# Patient Record
Sex: FEMALE | Race: WHITE | NOT HISPANIC OR LATINO | URBAN - METROPOLITAN AREA
[De-identification: names, ages, dates, MRNs, and addresses within clinical notes are randomized per-mention and may not be internally consistent; named-entity substitution may affect disease eponyms.]

---

## 2021-06-15 ENCOUNTER — OFFICE VISIT (OUTPATIENT)
Dept: URBAN - METROPOLITAN AREA CLINIC 78 | Facility: CLINIC | Age: 51
End: 2021-06-15
Payer: SELF-PAY

## 2021-06-15 VITALS
HEART RATE: 58 BPM | HEIGHT: 64 IN | RESPIRATION RATE: 16 BRPM | DIASTOLIC BLOOD PRESSURE: 76 MMHG | TEMPERATURE: 97.8 F | SYSTOLIC BLOOD PRESSURE: 150 MMHG | WEIGHT: 152 LBS | BODY MASS INDEX: 25.95 KG/M2

## 2021-06-15 DIAGNOSIS — R10.84 ABDOMINAL CRAMPING, GENERALIZED: ICD-10-CM

## 2021-06-15 DIAGNOSIS — R12 HEARTBURN: ICD-10-CM

## 2021-06-15 DIAGNOSIS — R63.4 UNINTENTIONAL WEIGHT LOSS: ICD-10-CM

## 2021-06-15 DIAGNOSIS — R11.2 NAUSEA AND VOMITING, INTRACTABILITY OF VOMITING NOT SPECIFIED, UNSPECIFIED VOMITING TYPE: ICD-10-CM

## 2021-06-15 DIAGNOSIS — R10.9 ABDOMINAL PAIN: ICD-10-CM

## 2021-06-15 DIAGNOSIS — R05 COUGH: ICD-10-CM

## 2021-06-15 DIAGNOSIS — A04.8 H. PYLORI INFECTION: ICD-10-CM

## 2021-06-15 DIAGNOSIS — R19.06 EPIGASTRIC FULLNESS: ICD-10-CM

## 2021-06-15 PROCEDURE — 99244 OFF/OP CNSLTJ NEW/EST MOD 40: CPT | Performed by: INTERNAL MEDICINE

## 2021-06-15 PROCEDURE — 99204 OFFICE O/P NEW MOD 45 MIN: CPT | Performed by: INTERNAL MEDICINE

## 2021-06-15 RX ORDER — SUCRALFATE 1 G/1
1 TABLET TABLET ORAL
Qty: 60 TABLET | Refills: 1 | OUTPATIENT
Start: 2021-06-15 | End: 2021-08-14

## 2021-06-15 RX ORDER — ONDANSETRON HYDROCHLORIDE 8 MG/1
1 TABLET TABLET, FILM COATED ORAL
Qty: 40 | Refills: 1 | OUTPATIENT
Start: 2021-06-15

## 2021-06-15 NOTE — HPI-TODAY'S VISIT:
The patient was referred to us by Beba KING at [facility] for severe gastritis. A copy of this note will be sent to the referring physician.   The patient states that she underwent blood work for H pylori serology after presenting with epigastric and retrosternal burning. She was told she had the infection and was treated with Amoxicillin, Flagyl, Clarithromycin and Omeprazole for 10 days. She did in fact stay on the Omeprazole for about a month.  Desptie completing treatment she feels miserable. She has ongoing abdominal pain. She has had more bloating than usual lately. She is afraid of eating and hence has had a 30lb unintentional weight loss. She has a feeling of fullness almost constantly and ongoing acid reflux + cough.   She has never had an EGD. She has been eating small, frequent meals as she cannot tolerate a "normal-sized meal." She continues to experience heartburn and occasional nausea/vomiting. No dysphagia. While on Omeprazole 20mg she could not tell much of a difference. She has also been on Sucralfate QID, Pantoprazole 40mg  There is no FH of stoimach or esophageal cancer.  There is no recent history of rectal bleeding. The patient has no pertinent additional complaints of  constipation or diarrhea.   She had been on Metformin for pre-diabetes but her PCP asked that she stop it as it was exacerbating even more her GI symptoms.    The patient does not take blood thinners.  The patient has never had a colonoscopy previously. There is no FH of colon cancer or colon polyps.

## 2021-06-18 ENCOUNTER — ERX REFILL RESPONSE (OUTPATIENT)
Dept: URBAN - METROPOLITAN AREA CLINIC 78 | Facility: CLINIC | Age: 51
End: 2021-06-18

## 2021-06-18 RX ORDER — SUCRALFATE 1 G/1
1 TABLET TABLET ORAL
Qty: 60 | Refills: 1

## 2021-06-25 ENCOUNTER — CLAIMS CREATED FROM THE CLAIM WINDOW (OUTPATIENT)
Dept: URBAN - METROPOLITAN AREA CLINIC 4 | Facility: CLINIC | Age: 51
End: 2021-06-25
Payer: SELF-PAY

## 2021-06-25 ENCOUNTER — OFFICE VISIT (OUTPATIENT)
Dept: URBAN - METROPOLITAN AREA SURGERY CENTER 15 | Facility: SURGERY CENTER | Age: 51
End: 2021-06-25
Payer: SELF-PAY

## 2021-06-25 DIAGNOSIS — K31.89 GASTRIC FOVEOLAR HYPERPLASIA: ICD-10-CM

## 2021-06-25 DIAGNOSIS — K21.9 GASTRO-ESOPHAGEAL REFLUX DISEASE WITHOUT ESOPHAGITIS: ICD-10-CM

## 2021-06-25 DIAGNOSIS — R63.4 ABNORMAL INTENTIONAL WEIGHT LOSS: ICD-10-CM

## 2021-06-25 DIAGNOSIS — K29.40 CHRONIC ATROPHIC GASTRITIS WITHOUT BLEEDING: ICD-10-CM

## 2021-06-25 DIAGNOSIS — K63.89 POLYP OF ILEUM: ICD-10-CM

## 2021-06-25 DIAGNOSIS — R10.84 ABDOMINAL CRAMPING, GENERALIZED: ICD-10-CM

## 2021-06-25 DIAGNOSIS — K29.60 ADENOPAPILLOMATOSIS GASTRICA: ICD-10-CM

## 2021-06-25 DIAGNOSIS — K21.9 ACID REFLUX: ICD-10-CM

## 2021-06-25 DIAGNOSIS — K63.89 BACTERIAL OVERGROWTH SYNDROME: ICD-10-CM

## 2021-06-25 PROCEDURE — 88342 IMHCHEM/IMCYTCHM 1ST ANTB: CPT | Performed by: PATHOLOGY

## 2021-06-25 PROCEDURE — 88305 TISSUE EXAM BY PATHOLOGIST: CPT | Performed by: PATHOLOGY

## 2021-06-25 PROCEDURE — G8907 PT DOC NO EVENTS ON DISCHARG: HCPCS | Performed by: INTERNAL MEDICINE

## 2021-06-25 PROCEDURE — 43239 EGD BIOPSY SINGLE/MULTIPLE: CPT | Performed by: INTERNAL MEDICINE

## 2021-06-25 PROCEDURE — 88312 SPECIAL STAINS GROUP 1: CPT | Performed by: PATHOLOGY

## 2021-06-25 PROCEDURE — 45385 COLONOSCOPY W/LESION REMOVAL: CPT | Performed by: INTERNAL MEDICINE

## 2021-06-25 RX ORDER — SUCRALFATE 1 G/1
1 TABLET TABLET ORAL
Qty: 60 | Refills: 1 | Status: ACTIVE | COMMUNITY

## 2021-06-25 RX ORDER — ONDANSETRON HYDROCHLORIDE 8 MG/1
1 TABLET TABLET, FILM COATED ORAL
Qty: 40 | Refills: 1 | Status: ACTIVE | COMMUNITY
Start: 2021-06-15

## 2021-08-19 ENCOUNTER — OFFICE VISIT (OUTPATIENT)
Dept: URBAN - METROPOLITAN AREA CLINIC 78 | Facility: CLINIC | Age: 51
End: 2021-08-19
Payer: SELF-PAY

## 2021-08-19 DIAGNOSIS — R05 COUGH: ICD-10-CM

## 2021-08-19 DIAGNOSIS — R11.2 NAUSEA AND VOMITING, INTRACTABILITY OF VOMITING NOT SPECIFIED, UNSPECIFIED VOMITING TYPE: ICD-10-CM

## 2021-08-19 DIAGNOSIS — R19.06 EPIGASTRIC FULLNESS: ICD-10-CM

## 2021-08-19 DIAGNOSIS — R12 HEARTBURN: ICD-10-CM

## 2021-08-19 DIAGNOSIS — E73.9 LACTOSE INTOLERANCE: ICD-10-CM

## 2021-08-19 DIAGNOSIS — R10.9 ABDOMINAL PAIN: ICD-10-CM

## 2021-08-19 DIAGNOSIS — R63.4 UNINTENTIONAL WEIGHT LOSS: ICD-10-CM

## 2021-08-19 DIAGNOSIS — I10 HYPERTENSION, UNSPECIFIED TYPE: ICD-10-CM

## 2021-08-19 DIAGNOSIS — R19.8 ALTERNATING CONSTIPATION AND DIARRHEA: ICD-10-CM

## 2021-08-19 DIAGNOSIS — A04.8 H. PYLORI INFECTION: ICD-10-CM

## 2021-08-19 PROCEDURE — 99214 OFFICE O/P EST MOD 30 MIN: CPT | Performed by: INTERNAL MEDICINE

## 2021-08-19 RX ORDER — ONDANSETRON HYDROCHLORIDE 8 MG/1
1 TABLET TABLET, FILM COATED ORAL
Qty: 40 | Refills: 1 | Status: ACTIVE | COMMUNITY
Start: 2021-06-15

## 2021-08-19 RX ORDER — HYDROCHLOROTHIAZIDE 12.5 MG/1
1 CAPSULE IN THE MORNING CAPSULE ORAL ONCE A DAY
Qty: 30 | OUTPATIENT
Start: 2021-08-19

## 2021-08-19 RX ORDER — SUCRALFATE 1 G/1
1 TABLET TABLET ORAL
Qty: 60 | Refills: 1 | Status: ACTIVE | COMMUNITY

## 2021-08-19 NOTE — HPI-TODAY'S VISIT:
The patient was referred to us by Beba KING at [facility] for evaluation of gastritis. A copy of this note will be sent to the referring physician.   The patient states that she underwent blood work for H pylori serology after presenting with epigastric and retrosternal burning. She was told she had the infection and was treated with Amoxicillin, Flagyl, Clarithromycin and Omeprazole for 10 days. She did in fact stay on the Omeprazole for about a month.  Despite completing treatment she still felt miserable with ongoing abdominal pain and bloating. She has also been on Sucralfate QID, Pantoprazole 40mg   She was afraid of eating and hence had had a 30lb unintentional weight loss. She has gained 3 lbs since her last visit and admits to feeling better and eating overall well. Shehas not had to use the Zofran I prescribed.  She has a feeling of fullness almost constantly and ongoing acid reflux + cough. The cough is mainly occurring at night and waking her up from sleep as she feels she is choking.   She has noted alternating constipation and diarrhea. There is no recent history of rectal bleeding.   There is no FH of stomach or esophageal cancer.   She had been on Metformin for pre-diabetes but her PCP asked that she stop it as it was exacerbating even more her GI symptoms.  She tells me today she is running out of her HCTZ and needs a new prescription while she tries to get it to see her PCP.   The patient does not take blood thinners.  Today we reviewed the results of her recent EGD and colonoscopy/path.  There is no FH of colon cancer or colon polyps.   Summary of prior workup: - EGD and colonoscopy by me in 6/25/2021 revealed a normal upper GI tract (proximal and distal biopsies consistent with reflux, stomach biopsies consistent with chronic inactive gastritis and histological changes suggestive of treated H. pylori gastritis, duodenal biopsies unremarkable).  Terminal ileum was normal.  3 mm  lymphoid aggregate in the descending colon and nonbleeding internal hemorrhoids.  The quality of the prep was good.  A repeat colonoscopy was advised in 10 years.

## 2021-09-30 ENCOUNTER — OFFICE VISIT (OUTPATIENT)
Dept: URBAN - METROPOLITAN AREA CLINIC 78 | Facility: CLINIC | Age: 51
End: 2021-09-30
Payer: SELF-PAY

## 2021-09-30 VITALS
TEMPERATURE: 98 F | BODY MASS INDEX: 26.7 KG/M2 | WEIGHT: 156.4 LBS | HEART RATE: 56 BPM | HEIGHT: 64 IN | DIASTOLIC BLOOD PRESSURE: 82 MMHG | SYSTOLIC BLOOD PRESSURE: 144 MMHG

## 2021-09-30 DIAGNOSIS — E73.9 LACTOSE INTOLERANCE: ICD-10-CM

## 2021-09-30 DIAGNOSIS — R19.8 ALTERNATING CONSTIPATION AND DIARRHEA: ICD-10-CM

## 2021-09-30 DIAGNOSIS — I10 HYPERTENSION, UNSPECIFIED TYPE: ICD-10-CM

## 2021-09-30 DIAGNOSIS — R10.9 ABDOMINAL PAIN: ICD-10-CM

## 2021-09-30 DIAGNOSIS — R12 HEARTBURN: ICD-10-CM

## 2021-09-30 DIAGNOSIS — R19.06 EPIGASTRIC FULLNESS: ICD-10-CM

## 2021-09-30 DIAGNOSIS — R05 COUGH: ICD-10-CM

## 2021-09-30 DIAGNOSIS — A04.8 H. PYLORI INFECTION: ICD-10-CM

## 2021-09-30 DIAGNOSIS — R63.4 UNINTENTIONAL WEIGHT LOSS: ICD-10-CM

## 2021-09-30 DIAGNOSIS — R11.2 NAUSEA AND VOMITING, INTRACTABILITY OF VOMITING NOT SPECIFIED, UNSPECIFIED VOMITING TYPE: ICD-10-CM

## 2021-09-30 PROCEDURE — 99214 OFFICE O/P EST MOD 30 MIN: CPT | Performed by: INTERNAL MEDICINE

## 2021-09-30 RX ORDER — SUCRALFATE 1 G/1
1 TABLET TABLET ORAL
Qty: 60 | Refills: 1 | Status: ACTIVE | COMMUNITY

## 2021-09-30 RX ORDER — HYDROCHLOROTHIAZIDE 12.5 MG/1
1 CAPSULE IN THE MORNING CAPSULE ORAL ONCE A DAY
Qty: 30 | Status: ACTIVE | COMMUNITY
Start: 2021-08-19

## 2021-09-30 RX ORDER — ONDANSETRON HYDROCHLORIDE 8 MG/1
1 TABLET TABLET, FILM COATED ORAL
Qty: 40 | Refills: 1 | Status: ACTIVE | COMMUNITY
Start: 2021-06-15

## 2021-09-30 NOTE — HPI-TODAY'S VISIT:
The patient was referred to us by Beba KING at [facility] for evaluation of gastritis. A copy of this note will be sent to the referring physician.   The patient had initially seen me stating that she underwent blood work for H pylori serology after presenting with epigastric and retrosternal burning. She was told she had the infection and was treated with Amoxicillin, Flagyl, Clarithromycin and Omeprazole for 10 days. She did in fact stay on the Omeprazole for about a month after completing the antibiotics + eventually on Sucralfate QID.  Despite completing treatment she still felt miserable with ongoing abdominal pain and bloating.  She was afraid of eating and hence had had a 30lb unintentional weight loss. She admits to feeling better and eating overall well. She has not had to use the Zofran I prescribed. Her weight has continued to remain stable.   She has a feeling of epigastric fullness almost constantly and ongoing acid reflux + cough. The cough is mainly occurring at night and waking her up from sleep as she feels she is choking.   She has been feeling better on Dexilant. She still has the pain but occurring occasionally. She still has some nighttime reflux but overall she is feeling 50% better compared to her last visit.  She has been using Benefiber and Miralax and has been noting better BMs. No blood in the stools.   She has noted alternating constipation and diarrhea. There is no recent history of rectal bleeding.   There is no FH of stomach or esophageal cancer.  She had been on Metformin for pre-diabetes but her PCP asked that she stop it as it was exacerbating even more her GI symptoms.   The patient does not take blood thinners.  There is no FH of colon cancer or colon polyps.   Summary of prior workup: - EGD and colonoscopy by me in 6/25/2021 revealed a normal upper GI tract (proximal and distal biopsies consistent with reflux, stomach biopsies consistent with chronic inactive gastritis and histological changes suggestive of treated H. pylori gastritis, duodenal biopsies unremarkable).  Terminal ileum was normal.  3 mm  lymphoid aggregate in the descending colon and nonbleeding internal hemorrhoids.  The quality of the prep was good.  A repeat colonoscopy was advised in 10 years.

## 2021-12-02 ENCOUNTER — OFFICE VISIT (OUTPATIENT)
Dept: URBAN - METROPOLITAN AREA CLINIC 78 | Facility: CLINIC | Age: 51
End: 2021-12-02
Payer: SELF-PAY

## 2021-12-02 VITALS
TEMPERATURE: 97.3 F | DIASTOLIC BLOOD PRESSURE: 81 MMHG | HEIGHT: 64 IN | SYSTOLIC BLOOD PRESSURE: 146 MMHG | BODY MASS INDEX: 27.42 KG/M2 | WEIGHT: 160.6 LBS | HEART RATE: 69 BPM

## 2021-12-02 DIAGNOSIS — R63.4 UNINTENTIONAL WEIGHT LOSS: ICD-10-CM

## 2021-12-02 DIAGNOSIS — R10.84 ABDOMINAL CRAMPING, GENERALIZED: ICD-10-CM

## 2021-12-02 DIAGNOSIS — R05.9 COUGH: ICD-10-CM

## 2021-12-02 DIAGNOSIS — R12 HEARTBURN: ICD-10-CM

## 2021-12-02 PROBLEM — 38341003: Status: ACTIVE | Noted: 2021-08-19

## 2021-12-02 PROBLEM — 782415009: Status: ACTIVE | Noted: 2021-08-19

## 2021-12-02 PROCEDURE — 99214 OFFICE O/P EST MOD 30 MIN: CPT | Performed by: INTERNAL MEDICINE

## 2021-12-02 RX ORDER — METFORMIN HYDROCHLORIDE 500 MG/1
1 TABLET WITH A MEAL TABLET, FILM COATED ORAL ONCE A DAY
Status: ACTIVE | COMMUNITY

## 2021-12-02 RX ORDER — CHOLECALCIFEROL (VITAMIN D3) 50 MCG
1 TABLET TABLET ORAL ONCE A DAY
Status: ACTIVE | COMMUNITY

## 2021-12-02 RX ORDER — ONDANSETRON HYDROCHLORIDE 8 MG/1
1 TABLET TABLET, FILM COATED ORAL
Qty: 40 | Refills: 1 | Status: ON HOLD | COMMUNITY
Start: 2021-06-15

## 2021-12-02 RX ORDER — LANSOPRAZOLE 15 MG/1
AS DIRECTED CAPSULE, DELAYED RELEASE ORAL
Status: ACTIVE | COMMUNITY

## 2021-12-02 RX ORDER — KRILL/OM-3/DHA/EPA/PHOSPHO/AST 500-110 MG
AS DIRECTED CAPSULE ORAL
Status: ACTIVE | COMMUNITY

## 2021-12-02 RX ORDER — HYDROCHLOROTHIAZIDE 12.5 MG/1
1 CAPSULE IN THE MORNING CAPSULE ORAL ONCE A DAY
Qty: 30 | Status: ACTIVE | COMMUNITY
Start: 2021-08-19

## 2021-12-02 RX ORDER — OMEPRAZOLE 40 MG/1
1 CAPSULE CAPSULE, DELAYED RELEASE ORAL
Qty: 30 | Refills: 2 | OUTPATIENT
Start: 2021-12-02

## 2021-12-02 RX ORDER — SUCRALFATE 1 G/1
1 TABLET TABLET ORAL
Qty: 60 | Refills: 1 | Status: ON HOLD | COMMUNITY

## 2021-12-02 NOTE — HPI-TODAY'S VISIT:
The patient was referred to us by Beba KING at [facility] for evaluation of gastritis. A copy of this note will be sent to the referring physician.   The patient had initially seen me stating that she underwent blood work for H pylori serology after presenting with epigastric and retrosternal burning. She was told she had the infection and was treated with Amoxicillin, Flagyl, Clarithromycin and Omeprazole for 10 days. She did in fact stay on the Omeprazole for about a month after completing the antibiotics + eventually on Sucralfate QID.  Despite completing treatment she still felt miserable with ongoing abdominal pain and bloating.  She was afraid of eating and hence had had a 30lb unintentional weight loss. She admits to feeling better and eating overall well. She has not had to use the Zofran I prescribed.  She had been feeling better on Dexilant but ran out of samples I provided. She is now having very rare heartburn and very rare instances of lower abdominal pain. She has been using the Citrucel with great effectiveness but ran out of it a few days ago and hence hasn't been using it. She has been gained about 4 lbs since her last visit. Patient was given Boost samples which she has been taking.  The cough is better and still mainly occurring at night. She has been more cogniscent of avoiding a heavy/late dinner.  She has been using Benefiber and Miralax and has been noting better BMs. No blood in the stools.   There is no FH of stomach or esophageal cancer.  She had been on Metformin for pre-diabetes but her PCP asked that she stop it as it was exacerbating even more her GI symptoms.   She was noted to have Vit D deficiency.  The patient does not take blood thinners.  There is no FH of colon cancer or colon polyps.   Summary of prior workup: - EGD and colonoscopy by me in 6/25/2021 revealed a normal upper GI tract (proximal and distal biopsies consistent with reflux, stomach biopsies consistent with chronic inactive gastritis and histological changes suggestive of treated H. pylori gastritis, duodenal biopsies unremarkable).  Terminal ileum was normal.  3 mm  lymphoid aggregate in the descending colon and nonbleeding internal hemorrhoids.  The quality of the prep was good.  A repeat colonoscopy was advised in 10 years.

## 2023-09-14 ENCOUNTER — TELEPHONE ENCOUNTER (OUTPATIENT)
Dept: URBAN - METROPOLITAN AREA CLINIC 78 | Facility: CLINIC | Age: 53
End: 2023-09-14

## 2023-10-05 ENCOUNTER — OFFICE VISIT (OUTPATIENT)
Dept: URBAN - METROPOLITAN AREA TELEHEALTH 2 | Facility: TELEHEALTH | Age: 53
End: 2023-10-05
Payer: SELF-PAY

## 2023-10-05 DIAGNOSIS — R12 HEARTBURN: ICD-10-CM

## 2023-10-05 DIAGNOSIS — A04.8 OTHER SPECIFIED BACTERIAL INTESTINAL INFECTIONS: ICD-10-CM

## 2023-10-05 DIAGNOSIS — R11.2 NAUSEA AND VOMITING, INTRACTABILITY OF VOMITING NOT SPECIFIED, UNSPECIFIED VOMITING TYPE: ICD-10-CM

## 2023-10-05 DIAGNOSIS — R05.8 OTHER COUGH: ICD-10-CM

## 2023-10-05 PROCEDURE — 99214 OFFICE O/P EST MOD 30 MIN: CPT | Performed by: INTERNAL MEDICINE

## 2023-10-05 RX ORDER — CHOLECALCIFEROL (VITAMIN D3) 50 MCG
1 TABLET TABLET ORAL ONCE A DAY
Status: ACTIVE | COMMUNITY

## 2023-10-05 RX ORDER — SUCRALFATE 1 G/1
1 TABLET TABLET ORAL
Qty: 60 | Refills: 1 | Status: ON HOLD | COMMUNITY

## 2023-10-05 RX ORDER — OMEPRAZOLE 40 MG/1
1 CAPSULE CAPSULE, DELAYED RELEASE ORAL
Qty: 30 | Refills: 2 | Status: ACTIVE | COMMUNITY
Start: 2021-12-02

## 2023-10-05 RX ORDER — ONDANSETRON HYDROCHLORIDE 8 MG/1
1 TABLET TABLET, FILM COATED ORAL
Qty: 40 | Refills: 1 | Status: ON HOLD | COMMUNITY
Start: 2021-06-15

## 2023-10-05 RX ORDER — KRILL/OM-3/DHA/EPA/PHOSPHO/AST 500-110 MG
AS DIRECTED CAPSULE ORAL
Status: ACTIVE | COMMUNITY

## 2023-10-05 RX ORDER — LANSOPRAZOLE 15 MG/1
AS DIRECTED CAPSULE, DELAYED RELEASE ORAL
Status: ACTIVE | COMMUNITY

## 2023-10-05 RX ORDER — METFORMIN HYDROCHLORIDE 500 MG/1
1 TABLET WITH A MEAL TABLET, FILM COATED ORAL ONCE A DAY
Status: ACTIVE | COMMUNITY

## 2023-10-05 RX ORDER — HYDROCHLOROTHIAZIDE 12.5 MG/1
1 CAPSULE IN THE MORNING CAPSULE ORAL ONCE A DAY
Qty: 30 | Status: ACTIVE | COMMUNITY
Start: 2021-08-19

## 2023-10-05 NOTE — HPI-TODAY'S VISIT:
The patient was referred to us by Beba KING at [facility] for evaluation of gastritis. A copy of this note will be sent to the referring physician.   The patient had initially seen me stating that she underwent blood work for H pylori serology after presenting with epigastric and retrosternal burning. She was told she had the infection and was treated with Amoxicillin, Flagyl, Clarithromycin and Omeprazole for 10 days. She did in fact stay on the Omeprazole for about a month after completing the antibiotics + eventually on Sucralfate QID.  Despite completing treatment she still felt miserable with ongoing abdominal pain and bloating.  She was afraid of eating and hence had had a 30lb unintentional weight loss. She admits to feeling better and eating overall well. She has not had to use the Zofran I prescribed.   She was recently switched from Omeprazole to Pantoprazole QD. She still has some burning in her throat, however her symptoms are not as severe as they were previously.  She complains of a burning sensation in the epigastrium.  She denies much in the way of coughing. When it does happen however, she experiences hoarseness immediately. She has been more cogniscent of avoiding a heavy/late dinner. Her last meal of the day has been ~4PM.  She has been using Benefiber and Miralax and has been noting better BMs. No blood in the stools.  When she has not had a BM she notices increased epigastric fullness. She is only on Citrucel currently.    There is no FH of stomach or esophageal cancer.  She was noted to have Vit D deficiency.  The patient does not take blood thinners.  There is no FH of colon cancer or colon polyps.   Summary of prior workup: - EGD and colonoscopy by me in 6/25/2021 revealed a normal upper GI tract (proximal and distal biopsies consistent with reflux, stomach biopsies consistent with chronic inactive gastritis and histological changes suggestive of treated H. pylori gastritis, duodenal biopsies unremarkable).  Terminal ileum was normal.  3 mm  lymphoid aggregate in the descending colon and nonbleeding internal hemorrhoids.  The quality of the prep was good.  A repeat colonoscopy was advised in 10 years.

## 2023-10-06 ENCOUNTER — TELEPHONE ENCOUNTER (OUTPATIENT)
Dept: URBAN - METROPOLITAN AREA CLINIC 78 | Facility: CLINIC | Age: 53
End: 2023-10-06

## 2023-10-14 ENCOUNTER — DASHBOARD ENCOUNTERS (OUTPATIENT)
Age: 53
End: 2023-10-14

## 2023-10-25 ENCOUNTER — CLAIMS CREATED FROM THE CLAIM WINDOW (OUTPATIENT)
Dept: URBAN - METROPOLITAN AREA SURGERY CENTER 15 | Facility: SURGERY CENTER | Age: 53
End: 2023-10-25
Payer: SELF-PAY

## 2023-10-25 ENCOUNTER — CLAIMS CREATED FROM THE CLAIM WINDOW (OUTPATIENT)
Dept: URBAN - METROPOLITAN AREA CLINIC 4 | Facility: CLINIC | Age: 53
End: 2023-10-25
Payer: SELF-PAY

## 2023-10-25 ENCOUNTER — CLAIMS CREATED FROM THE CLAIM WINDOW (OUTPATIENT)
Dept: URBAN - METROPOLITAN AREA SURGERY CENTER 15 | Facility: SURGERY CENTER | Age: 53
End: 2023-10-25

## 2023-10-25 DIAGNOSIS — K21.9 GASTROESOPHAGEAL REFLUX DISEASE: ICD-10-CM

## 2023-10-25 DIAGNOSIS — R63.4 WEIGHT LOSS: ICD-10-CM

## 2023-10-25 DIAGNOSIS — R10.13 EPIGASTRIC ABDOMINAL PAIN: ICD-10-CM

## 2023-10-25 DIAGNOSIS — R05.3 CHRONIC COUGH: ICD-10-CM

## 2023-10-25 DIAGNOSIS — R12 HEARTBURN: ICD-10-CM

## 2023-10-25 DIAGNOSIS — K31.89 OTHER DISEASES OF STOMACH AND DUODENUM: ICD-10-CM

## 2023-10-25 DIAGNOSIS — R14.0 ABDOMINAL BLOATING: ICD-10-CM

## 2023-10-25 DIAGNOSIS — R14.0 BLOATING: ICD-10-CM

## 2023-10-25 DIAGNOSIS — K21.9 GASTRO-ESOPHAGEAL REFLUX DISEASE WITHOUT ESOPHAGITIS: ICD-10-CM

## 2023-10-25 PROCEDURE — 00731 ANES UPR GI NDSC PX NOS: CPT | Performed by: NURSE ANESTHETIST, CERTIFIED REGISTERED

## 2023-10-25 PROCEDURE — 88342 IMHCHEM/IMCYTCHM 1ST ANTB: CPT | Performed by: PATHOLOGY

## 2023-10-25 PROCEDURE — 43239 EGD BIOPSY SINGLE/MULTIPLE: CPT | Performed by: INTERNAL MEDICINE

## 2023-10-25 PROCEDURE — 88305 TISSUE EXAM BY PATHOLOGIST: CPT | Performed by: PATHOLOGY

## 2023-10-25 PROCEDURE — 88312 SPECIAL STAINS GROUP 1: CPT | Performed by: PATHOLOGY

## 2023-10-25 PROCEDURE — G8907 PT DOC NO EVENTS ON DISCHARG: HCPCS | Performed by: INTERNAL MEDICINE

## 2023-10-25 RX ORDER — SUCRALFATE 1 G/1
1 TABLET TABLET ORAL
Qty: 60 | Refills: 1 | Status: ON HOLD | COMMUNITY

## 2023-10-25 RX ORDER — ONDANSETRON HYDROCHLORIDE 8 MG/1
1 TABLET TABLET, FILM COATED ORAL
Qty: 40 | Refills: 1 | Status: ON HOLD | COMMUNITY
Start: 2021-06-15

## 2023-10-25 RX ORDER — CHOLECALCIFEROL (VITAMIN D3) 50 MCG
1 TABLET TABLET ORAL ONCE A DAY
Status: ACTIVE | COMMUNITY

## 2023-10-25 RX ORDER — METFORMIN HYDROCHLORIDE 500 MG/1
1 TABLET WITH A MEAL TABLET, FILM COATED ORAL ONCE A DAY
Status: ACTIVE | COMMUNITY

## 2023-10-25 RX ORDER — OMEPRAZOLE 40 MG/1
1 CAPSULE CAPSULE, DELAYED RELEASE ORAL
Qty: 30 | Refills: 2 | Status: ACTIVE | COMMUNITY
Start: 2021-12-02

## 2023-10-25 RX ORDER — LANSOPRAZOLE 15 MG/1
AS DIRECTED CAPSULE, DELAYED RELEASE ORAL
Status: ACTIVE | COMMUNITY

## 2023-10-25 RX ORDER — KRILL/OM-3/DHA/EPA/PHOSPHO/AST 500-110 MG
AS DIRECTED CAPSULE ORAL
Status: ACTIVE | COMMUNITY

## 2023-10-25 RX ORDER — HYDROCHLOROTHIAZIDE 12.5 MG/1
1 CAPSULE IN THE MORNING CAPSULE ORAL ONCE A DAY
Qty: 30 | Status: ACTIVE | COMMUNITY
Start: 2021-08-19